# Patient Record
Sex: MALE | Race: WHITE | NOT HISPANIC OR LATINO | Employment: OTHER | URBAN - METROPOLITAN AREA
[De-identification: names, ages, dates, MRNs, and addresses within clinical notes are randomized per-mention and may not be internally consistent; named-entity substitution may affect disease eponyms.]

---

## 2019-07-23 ENCOUNTER — TRANSCRIBE ORDERS (OUTPATIENT)
Dept: NEUROSURGERY | Facility: CLINIC | Age: 49
End: 2019-07-23

## 2019-07-23 DIAGNOSIS — M54.50 LOWER BACK PAIN: Primary | ICD-10-CM

## 2019-08-20 ENCOUNTER — CONSULT (OUTPATIENT)
Dept: NEUROSURGERY | Facility: CLINIC | Age: 49
End: 2019-08-20
Payer: COMMERCIAL

## 2019-08-20 ENCOUNTER — DOCUMENTATION (OUTPATIENT)
Dept: NEUROSURGERY | Facility: CLINIC | Age: 49
End: 2019-08-20

## 2019-08-20 VITALS
BODY MASS INDEX: 29.93 KG/M2 | SYSTOLIC BLOOD PRESSURE: 121 MMHG | WEIGHT: 233.2 LBS | HEIGHT: 74 IN | DIASTOLIC BLOOD PRESSURE: 84 MMHG | RESPIRATION RATE: 16 BRPM | HEART RATE: 69 BPM

## 2019-08-20 DIAGNOSIS — M54.50 LOWER BACK PAIN: ICD-10-CM

## 2019-08-20 DIAGNOSIS — G89.4 CHRONIC PAIN SYNDROME: Primary | ICD-10-CM

## 2019-08-20 DIAGNOSIS — M96.1 FAILED BACK SURGICAL SYNDROME: ICD-10-CM

## 2019-08-20 DIAGNOSIS — G96.198 PSEUDOMENINGOCELE, ACQUIRED: ICD-10-CM

## 2019-08-20 PROCEDURE — 99204 OFFICE O/P NEW MOD 45 MIN: CPT | Performed by: NEUROLOGICAL SURGERY

## 2019-08-20 RX ORDER — SIMVASTATIN 10 MG
TABLET ORAL DAILY
COMMUNITY

## 2019-08-20 RX ORDER — BUTALBITAL/ACETAMINOPHEN 50MG-325MG
TABLET ORAL AS NEEDED
COMMUNITY

## 2019-08-20 RX ORDER — SERTRALINE HYDROCHLORIDE 100 MG/1
100 TABLET, FILM COATED ORAL DAILY
COMMUNITY
Start: 2019-05-06

## 2019-08-20 RX ORDER — HYDROXYZINE HYDROCHLORIDE 25 MG/1
25 TABLET, FILM COATED ORAL AS NEEDED
COMMUNITY
Start: 2018-08-02

## 2019-08-20 RX ORDER — OMEPRAZOLE 40 MG/1
40 CAPSULE, DELAYED RELEASE ORAL DAILY
Status: ON HOLD | COMMUNITY
Start: 2018-08-02 | End: 2020-07-02 | Stop reason: ALTCHOICE

## 2019-08-20 RX ORDER — OXYCODONE AND ACETAMINOPHEN 10; 325 MG/1; MG/1
TABLET ORAL 3 TIMES DAILY PRN
COMMUNITY
Start: 2018-12-19

## 2019-08-20 RX ORDER — TIZANIDINE 4 MG/1
4 TABLET ORAL DAILY
COMMUNITY
Start: 2018-12-19

## 2019-08-20 RX ORDER — GABAPENTIN 100 MG/1
200 CAPSULE ORAL 3 TIMES DAILY
COMMUNITY
Start: 2019-01-02

## 2019-08-20 RX ORDER — LISINOPRIL 5 MG/1
5 TABLET ORAL DAILY
COMMUNITY
Start: 2019-02-27

## 2019-08-20 NOTE — PROGRESS NOTES
Office Note - Neurosurgery   Rochelle Hodgkins 47 y o  male MRN: [id number]      Assessment:    Patient is stable  18-year-old gentleman with lower back and bilateral leg pain likely secondary to failed back syndrome and chronic pain syndrome  While he does have a pseudomeningocele he does not appear to be symptomatic from this  Would not recommend any surgical intervention to repair the pseudomeningocele, though a blood patch or glue could be considered through intervention Radiology  His presentation is more consistent with failed back syndrome or post laminectomy syndrome  There may be an element of arachnoiditis on his MRI  There may be an SI joint dysfunction component as well  He may wish to consider neuromodulation the form of spinal cord stimulator trial   If successful, he could return to his neurosurgeon to discuss implantation of a permanent system  Will follow up through this office on a p r n  Basis  History, physical examination and diagnostic tests were reviewed and questions answered  Diagnosis, care plan and treatment options were discussed  The patient understand instructions and will follow up as directed  Plan:    Follow-up: prn    Problem List Items Addressed This Visit        Nervous and Auditory    Pseudomeningocele, acquired       Other    Failed back surgical syndrome    Chronic pain syndrome - Primary      Other Visit Diagnoses     Lower back pain              Subjective/Objective     Chief Complaint    Low back and bilateral leg pain  HPI    18-year-old gentleman who underwent left L2-3 minimally invasive decompression in December of 2018 with another surgeon for lumbar radiculopathy  His radiculopathy resolved but he had significant positional headaches for months afterwards and developed lower back and bilateral leg pain which radiates mostly into the buttocks and hamstrings but can radiate down the legs  There is associated numbness and weakness    He denies any difficulties with bowel bladder function or changing perineal sensation  His current pain medications are listed below  He recently underwent facet injection which were somewhat helpful  He followed up with his neurosurgeon and was ultimately diagnosed with a pseudomeningocele  They discussed repair  He presents today for 2nd opinion  LETTY SAENZ personally reviewed and updated  Review of Systems   Constitutional: Positive for fatigue  HENT: Positive for hearing loss (right ear) and tinnitus  Negative for trouble swallowing  Eyes: Positive for visual disturbance  Respiratory: Negative  Cardiovascular: Negative  Gastrointestinal: Positive for constipation  Endocrine: Negative  Genitourinary: Negative  Musculoskeletal: Positive for back pain (centered of mid back radiates down to lower back, across lower back radiates into bilateral buttocks, bilateral hips, and down bilateral legs/foot )  Negative for gait problem  Skin: Negative  Allergic/Immunologic: Negative  Neurological: Positive for weakness (bilateral legs ) and headaches  Negative for dizziness, seizures, syncope and numbness (shocking/tingling down bilateral legs )  Hematological: Negative  Psychiatric/Behavioral: Positive for sleep disturbance (due to pain )  Negative for confusion         Family History    Family History   Problem Relation Age of Onset    Cancer Mother     Kidney failure Father     Cancer Father        Social History    Social History     Socioeconomic History    Marital status: /Civil Union     Spouse name: Not on file    Number of children: Not on file    Years of education: Not on file    Highest education level: Not on file   Occupational History    Not on file   Social Needs    Financial resource strain: Not on file    Food insecurity:     Worry: Not on file     Inability: Not on file    Transportation needs:     Medical: Not on file     Non-medical: Not on file   Tobacco Use    Smoking status: Never Smoker    Smokeless tobacco: Former User     Types: Chew   Substance and Sexual Activity    Alcohol use: Yes     Binge frequency: Monthly    Drug use: Never    Sexual activity: Not on file   Lifestyle    Physical activity:     Days per week: Not on file     Minutes per session: Not on file    Stress: Not on file   Relationships    Social connections:     Talks on phone: Not on file     Gets together: Not on file     Attends [employer] service: Not on file     Active member of club or organization: Not on file     Attends meetings of clubs or organizations: Not on file     Relationship status: Not on file    Intimate partner violence:     Fear of current or ex partner: Not on file     Emotionally abused: Not on file     Physically abused: Not on file     Forced sexual activity: Not on file   Other Topics Concern    Not on file   Social History Narrative    Not on file       Past Medical History    History reviewed  No pertinent past medical history      Surgical History    Past Surgical History:   Procedure Laterality Date    BACK SURGERY      KNEE SURGERY Left     LAMINECTOMY      LUMBAR DISCECTOMY      TUMOR REMOVAL      3 x from lumbar       Medications      Current Outpatient Medications:     ACETAMINOPHEN-BUTALBITAL  MG TABS, as needed, Disp: , Rfl:     diclofenac sodium (VOLTAREN) 1 %, as needed, Disp: , Rfl:     gabapentin (NEURONTIN) 100 mg capsule, Take 200 mg by mouth Three times a day, Disp: , Rfl:     hydrOXYzine HCL (ATARAX) 25 mg tablet, Take 25 mg by mouth as needed, Disp: , Rfl:     omeprazole (PriLOSEC) 40 MG capsule, Take 40 mg by mouth daily, Disp: , Rfl:     oxyCODONE-acetaminophen (PERCOCET)  mg per tablet, 3 (three) times a day as needed, Disp: , Rfl:     sertraline (ZOLOFT) 100 mg tablet, Take 100 mg by mouth daily, Disp: , Rfl:     tiZANidine (ZANAFLEX) 4 mg tablet, Take 4 mg by mouth daily, Disp: , Rfl:     lisinopril (ZESTRIL) 5 mg tablet, Take 5 mg by mouth daily, Disp: , Rfl:     simvastatin (ZOCOR) 10 mg tablet, daily, Disp: , Rfl:     Allergies    No Known Allergies    The following portions of the patient's history were reviewed and updated as appropriate: allergies, current medications, past family history, past medical history, past social history, past surgical history and problem list     Investigations    I personally reviewed the MRI results with the patient:    MRI of the lumbar spine without contrast dated April of 2019  Normal lumbar lordosis  At L2-3 there is been a left-sided medial facetectomy  There is a fluid collection consistent with meningocele at this level  Minimal stenosis at L4-5  No other areas of significant neural compression  There is some clumping of the nerve roots at the L2-3 level  No contrast had been administered  There is no central stenosis or clear mass effect on the nerve roots at the L2-3 level  Physical Exam    Vitals:  Blood pressure 121/84, pulse 69, resp  rate 16, height 6' 2" (1 88 m), weight 106 kg (233 lb 3 2 oz)  ,Body mass index is 29 94 kg/m²  Physical Exam   Constitutional: He is oriented to person, place, and time  He appears well-developed and well-nourished  No distress  HENT:   Head: Atraumatic  Eyes: EOM are normal    Pulmonary/Chest: Effort normal  No respiratory distress  Musculoskeletal: He exhibits no deformity  Some restriction range of motion on flexion and extension lumbar spine, though extension produces more pain than flexion  Tender palpation over left paraspinal lumbar incision  Tender to palpation over left SI joint  Gaenslen's test positive on the left  Neurological: He is alert and oriented to person, place, and time  5/5 power in lower extremities  Walks with a steady gait  Reports normal light touch sensation lower extremities  Straight leg raise negative bilaterally  Skin: Skin is warm and dry     Psychiatric: He has a normal mood and affect  His behavior is normal    Vitals reviewed  Neurologic Exam     Mental Status   Oriented to person, place, and time       Cranial Nerves     CN III, IV, VI   Extraocular motions are normal

## 2019-08-20 NOTE — PROGRESS NOTES
Received MRI L-Spine (dated 1/25/19), MRI L-Spine (dated 4/25/19), and MRI Hip/Pelvis (dated 5/9/19) disc at time of appointment on 8/20/19  Uploaded and returned to patient at time of appointment on 8/20/19

## 2019-09-05 ENCOUNTER — TELEPHONE (OUTPATIENT)
Dept: NEUROSURGERY | Facility: CLINIC | Age: 49
End: 2019-09-05

## 2020-06-26 DIAGNOSIS — Z11.59 SCREENING FOR VIRAL DISEASE: ICD-10-CM

## 2020-06-26 PROCEDURE — U0003 INFECTIOUS AGENT DETECTION BY NUCLEIC ACID (DNA OR RNA); SEVERE ACUTE RESPIRATORY SYNDROME CORONAVIRUS 2 (SARS-COV-2) (CORONAVIRUS DISEASE [COVID-19]), AMPLIFIED PROBE TECHNIQUE, MAKING USE OF HIGH THROUGHPUT TECHNOLOGIES AS DESCRIBED BY CMS-2020-01-R: HCPCS

## 2020-06-27 LAB — SARS-COV-2 RNA SPEC QL NAA+PROBE: NOT DETECTED

## 2020-06-29 ENCOUNTER — TRANSCRIBE ORDERS (OUTPATIENT)
Dept: ADMINISTRATIVE | Facility: HOSPITAL | Age: 50
End: 2020-06-29

## 2020-06-29 DIAGNOSIS — M54.50 LOW BACK PAIN, UNSPECIFIED BACK PAIN LATERALITY, UNSPECIFIED CHRONICITY, UNSPECIFIED WHETHER SCIATICA PRESENT: Primary | ICD-10-CM

## 2020-06-30 RX ORDER — COVID-19 ANTIGEN TEST
KIT MISCELLANEOUS
COMMUNITY

## 2020-06-30 NOTE — PRE-PROCEDURE INSTRUCTIONS
Pre-Surgery Instructions:   Medication Instructions    ACETAMINOPHEN-BUTALBITAL  MG TABS Instructed patient per Anesthesia Guidelines   diclofenac sodium (VOLTAREN) 1 % Instructed patient per Anesthesia Guidelines   gabapentin (NEURONTIN) 100 mg capsule Instructed patient per Anesthesia Guidelines   hydrOXYzine HCL (ATARAX) 25 mg tablet Instructed patient per Anesthesia Guidelines   lisinopril (ZESTRIL) 5 mg tablet Instructed patient per Anesthesia Guidelines   Naproxen Sodium (Aleve) 220 MG CAPS Instructed patient per Anesthesia Guidelines   oxyCODONE-acetaminophen (PERCOCET)  mg per tablet Instructed patient per Anesthesia Guidelines   sertraline (ZOLOFT) 100 mg tablet Instructed patient per Anesthesia Guidelines   simvastatin (ZOCOR) 10 mg tablet Instructed patient per Anesthesia Guidelines  Pre-op and bathing instructions given  Patient will use hibiclens if he buys some or if not, dial antibacterial soap for pre-op showers

## 2020-07-01 ENCOUNTER — HOSPITAL ENCOUNTER (OUTPATIENT)
Dept: MRI IMAGING | Facility: HOSPITAL | Age: 50
Discharge: HOME | End: 2020-07-01
Payer: COMMERCIAL

## 2020-07-01 ENCOUNTER — ANESTHESIA EVENT (OUTPATIENT)
Dept: PERIOP | Facility: HOSPITAL | Age: 50
End: 2020-07-01
Payer: COMMERCIAL

## 2020-07-01 DIAGNOSIS — M54.50 LOW BACK PAIN, UNSPECIFIED BACK PAIN LATERALITY, UNSPECIFIED CHRONICITY, UNSPECIFIED WHETHER SCIATICA PRESENT: ICD-10-CM

## 2020-07-01 PROCEDURE — 72148 MRI LUMBAR SPINE W/O DYE: CPT

## 2020-07-02 ENCOUNTER — ANESTHESIA (OUTPATIENT)
Dept: PERIOP | Facility: HOSPITAL | Age: 50
End: 2020-07-02
Payer: COMMERCIAL

## 2020-07-02 ENCOUNTER — HOSPITAL ENCOUNTER (OUTPATIENT)
Dept: RADIOLOGY | Facility: HOSPITAL | Age: 50
Setting detail: OUTPATIENT SURGERY
Discharge: HOME | End: 2020-07-02
Payer: COMMERCIAL

## 2020-07-02 ENCOUNTER — HOSPITAL ENCOUNTER (OUTPATIENT)
Facility: HOSPITAL | Age: 50
Setting detail: OUTPATIENT SURGERY
Discharge: HOME | End: 2020-07-02
Attending: ANESTHESIOLOGY | Admitting: ANESTHESIOLOGY
Payer: COMMERCIAL

## 2020-07-02 VITALS
OXYGEN SATURATION: 95 % | SYSTOLIC BLOOD PRESSURE: 124 MMHG | HEART RATE: 74 BPM | RESPIRATION RATE: 16 BRPM | HEIGHT: 74 IN | WEIGHT: 225 LBS | TEMPERATURE: 96.2 F | BODY MASS INDEX: 28.88 KG/M2 | DIASTOLIC BLOOD PRESSURE: 68 MMHG

## 2020-07-02 DIAGNOSIS — G89.4 CHRONIC PAIN SYNDROME: ICD-10-CM

## 2020-07-02 DIAGNOSIS — Z11.59 SCREENING FOR VIRAL DISEASE: Primary | ICD-10-CM

## 2020-07-02 PROCEDURE — C1778 LEAD, NEUROSTIMULATOR: HCPCS | Performed by: ANESTHESIOLOGY

## 2020-07-02 PROCEDURE — 72070 X-RAY EXAM THORAC SPINE 2VWS: CPT

## 2020-07-02 DEVICE — IMPLANTABLE DEVICE: Type: IMPLANTABLE DEVICE | Site: SPINE LUMBAR | Status: FUNCTIONAL

## 2020-07-02 RX ORDER — ONDANSETRON 2 MG/ML
INJECTION INTRAMUSCULAR; INTRAVENOUS AS NEEDED
Status: DISCONTINUED | OUTPATIENT
Start: 2020-07-02 | End: 2020-07-02 | Stop reason: SURG

## 2020-07-02 RX ORDER — LIDOCAINE HYDROCHLORIDE 20 MG/ML
INJECTION, SOLUTION INFILTRATION; PERINEURAL AS NEEDED
Status: DISCONTINUED | OUTPATIENT
Start: 2020-07-02 | End: 2020-07-02 | Stop reason: SURG

## 2020-07-02 RX ORDER — FENTANYL CITRATE/PF 50 MCG/ML
25 SYRINGE (ML) INJECTION
Status: DISCONTINUED | OUTPATIENT
Start: 2020-07-02 | End: 2020-07-02 | Stop reason: HOSPADM

## 2020-07-02 RX ORDER — PROPOFOL 10 MG/ML
INJECTION, EMULSION INTRAVENOUS CONTINUOUS PRN
Status: DISCONTINUED | OUTPATIENT
Start: 2020-07-02 | End: 2020-07-02 | Stop reason: SURG

## 2020-07-02 RX ORDER — CEFAZOLIN SODIUM 2 G/50ML
SOLUTION INTRAVENOUS AS NEEDED
Status: DISCONTINUED | OUTPATIENT
Start: 2020-07-02 | End: 2020-07-02 | Stop reason: SURG

## 2020-07-02 RX ORDER — SODIUM CHLORIDE 9 MG/ML
125 INJECTION, SOLUTION INTRAVENOUS CONTINUOUS
Status: DISCONTINUED | OUTPATIENT
Start: 2020-07-02 | End: 2020-07-02 | Stop reason: HOSPADM

## 2020-07-02 RX ORDER — FENTANYL CITRATE 50 UG/ML
INJECTION, SOLUTION INTRAMUSCULAR; INTRAVENOUS AS NEEDED
Status: DISCONTINUED | OUTPATIENT
Start: 2020-07-02 | End: 2020-07-02 | Stop reason: SURG

## 2020-07-02 RX ORDER — LIDOCAINE HYDROCHLORIDE AND EPINEPHRINE 10; 10 MG/ML; UG/ML
INJECTION, SOLUTION INFILTRATION; PERINEURAL AS NEEDED
Status: DISCONTINUED | OUTPATIENT
Start: 2020-07-02 | End: 2020-07-02 | Stop reason: HOSPADM

## 2020-07-02 RX ORDER — ALBUTEROL SULFATE 2.5 MG/3ML
2.5 SOLUTION RESPIRATORY (INHALATION) ONCE AS NEEDED
Status: DISCONTINUED | OUTPATIENT
Start: 2020-07-02 | End: 2020-07-02 | Stop reason: HOSPADM

## 2020-07-02 RX ORDER — HYDROMORPHONE HCL/PF 1 MG/ML
0.5 SYRINGE (ML) INJECTION
Status: DISCONTINUED | OUTPATIENT
Start: 2020-07-02 | End: 2020-07-02 | Stop reason: HOSPADM

## 2020-07-02 RX ORDER — CEFAZOLIN SODIUM 2 G/50ML
2000 SOLUTION INTRAVENOUS ONCE
Status: COMPLETED | OUTPATIENT
Start: 2020-07-02 | End: 2020-07-02

## 2020-07-02 RX ADMIN — FENTANYL CITRATE 100 MCG: 50 INJECTION, SOLUTION INTRAMUSCULAR; INTRAVENOUS at 14:20

## 2020-07-02 RX ADMIN — CEFAZOLIN SODIUM 2000 MG: 2 SOLUTION INTRAVENOUS at 13:45

## 2020-07-02 RX ADMIN — ONDANSETRON 4 MG: 2 INJECTION INTRAMUSCULAR; INTRAVENOUS at 14:28

## 2020-07-02 RX ADMIN — PROPOFOL 150 MCG/KG/MIN: 10 INJECTION, EMULSION INTRAVENOUS at 14:15

## 2020-07-02 RX ADMIN — LIDOCAINE HYDROCHLORIDE 3 ML: 20 INJECTION, SOLUTION INFILTRATION; PERINEURAL at 14:15

## 2020-07-02 RX ADMIN — SODIUM CHLORIDE 125 ML/HR: 0.9 INJECTION, SOLUTION INTRAVENOUS at 13:16

## 2020-07-02 RX ADMIN — FENTANYL CITRATE 100 MCG: 50 INJECTION, SOLUTION INTRAMUSCULAR; INTRAVENOUS at 14:13

## 2020-07-02 RX ADMIN — HYDROMORPHONE HYDROCHLORIDE 0.5 MG: 1 INJECTION, SOLUTION INTRAMUSCULAR; INTRAVENOUS; SUBCUTANEOUS at 15:33

## 2020-07-02 RX ADMIN — HYDROMORPHONE HYDROCHLORIDE 0.5 MG: 1 INJECTION, SOLUTION INTRAMUSCULAR; INTRAVENOUS; SUBCUTANEOUS at 15:44

## 2020-07-02 NOTE — INTERVAL H&P NOTE
H&P reviewed  After examining the patient I find no changes in the patients condition since the H&P had been written      Vitals:    07/02/20 1254   BP: 142/79   Pulse: 80   Resp: 16   Temp: 97 6 °F (36 4 °C)   SpO2: 98%

## 2020-07-02 NOTE — OP NOTE
OPERATIVE REPORT  PATIENT NAME: Jil Dao    :  1970  MRN: [id number]  Pt Location: [facility] OR ROOM 02    SURGERY DATE: 2020    Surgeon(s) and Role:     * Elizabeth García MD - Primary    Preop Diagnosis:  Chronic pain syndrome [G89 4]  Radiculopathy, lumbar region [M54 16]    Post-Op Diagnosis Codes:     * Chronic pain syndrome [G89 4]     * Radiculopathy, lumbar region [M54 16]    Procedure(s) (LRB):  SPINAL CORD STIMULATOR TRIAL (N/A)    Specimen(s):  * No specimens in log *    Estimated Blood Loss:   Minimal    Drains:  * No LDAs found *    Anesthesia Type:   Choice    Operative Indications:  Chronic pain syndrome [G89 4]  Radiculopathy, lumbar region [M54 16]      Operative Findings:  n/a    Complications:   None    Procedure and Technique:  Informed written consent was obtained  The patient was taken to the operating room and placed prone on the fluoroscopy table  An updated MRI of his lumbar spine was performed yesterday to make sure that the pseudomeningocele did not extend to the L1-2 level form the L2-3 level where his surgery was performed  His thoracolumbar region was prepped and draped in the usual sterile fashion using DuraPrep  Under fluoroscopic guidance, the L1-2 interspace was identified  A skin wheal was raised to the right side of the L3 spinous process  A modified 14 gauge epidural needle provided by [employer] was inserted through the skin wheal and advanced under fluoroscopic guidance to the L1-2 epidural space  The position of the needle into the epidural space was confirmed under lateral view  The [employer] trial spinal cord stimulator lead was inserted through the needle and advanced under AP view and lateral view to the thoracic epidural space midline to the level of upper part of T9 vertebra  The impedance for each contact was within normal limit  Different electrode combinations were tested    The patient reported excellent paresthesia across his lower back, down to his coccyx, and down both legs covering 100% of his usual painful area  The needle was then removed uneventfully and the lead anchored to the lumbar region at the needle insertion site using 3-0 Vicryl  Sterile dressings were applied  The patient was taken to the recovery room for programming of the spinal cord stimulator  The patient will return to the office in 5 days for removal of the trial lead  If he obtains more than 60% pain relief, surgical implantation of the unit will be discussed with the patient     I was present for the entire procedure    Patient Disposition:  PACU     SIGNATURE: Heidy Hinojosa MD  DATE: July 2, 2020  TIME: 3:12 PM

## 2020-07-02 NOTE — ANESTHESIA PREPROCEDURE EVALUATION
Review of Systems/Medical History  Patient summary reviewed  Chart reviewed  No history of anesthetic complications     Cardiovascular  Hyperlipidemia, Hypertension controlled,    Pulmonary  Smoker ex-smoker  ,        GI/Hepatic  Negative GI/hepatic ROS          Negative  ROS        Endo/Other  Negative endo/other ROS      GYN       Hematology   Musculoskeletal  Back pain , lumbar pain, Sciatica,        Neurology    Headaches,    Psychology     Chronic pain (Failed back syndrome),            Physical Exam    Airway    Mallampati score: II  TM Distance: >3 FB  Neck ROM: full     Dental   No notable dental hx     Cardiovascular  Rhythm: regular, Rate: normal, Cardiovascular exam normal    Pulmonary  Pulmonary exam normal Breath sounds clear to auscultation,     Other Findings        Anesthesia Plan  ASA Score- 2     Anesthesia Type- IV sedation with anesthesia with ASA Monitors  Additional Monitors:   Airway Plan:         Plan Factors-Patient not instructed to abstain from smoking on day of procedure  Patient did not smoke on day of surgery  Induction- intravenous  Postoperative Plan-     Informed Consent- Anesthetic plan and risks discussed with patient and spouse

## 2020-07-02 NOTE — ANESTHESIA POSTPROCEDURE EVALUATION
Post-Op Assessment Note    CV Status:  Stable  Pain Score: 6       Mental Status:  Alert and awake   Hydration Status:  Euvolemic   PONV Controlled:  Controlled   Airway Patency:  Patent   Post Op Vitals Reviewed: Yes      Staff: Anesthesiologist, CRNA           BP      Temp     Pulse     Resp      SpO2

## 2020-08-03 ENCOUNTER — TRANSCRIBE ORDERS (OUTPATIENT)
Dept: NEUROSURGERY | Facility: CLINIC | Age: 50
End: 2020-08-03

## 2020-08-03 DIAGNOSIS — M54.50 LOWER BACK PAIN: Primary | ICD-10-CM

## 2020-08-17 ENCOUNTER — CONSULT (OUTPATIENT)
Dept: NEUROSURGERY | Facility: CLINIC | Age: 50
End: 2020-08-17
Payer: COMMERCIAL

## 2020-08-17 VITALS
HEIGHT: 74 IN | TEMPERATURE: 97 F | SYSTOLIC BLOOD PRESSURE: 140 MMHG | HEART RATE: 90 BPM | DIASTOLIC BLOOD PRESSURE: 90 MMHG | RESPIRATION RATE: 16 BRPM | BODY MASS INDEX: 28.88 KG/M2 | WEIGHT: 225 LBS

## 2020-08-17 DIAGNOSIS — M54.16 LUMBAR RADICULOPATHY: ICD-10-CM

## 2020-08-17 DIAGNOSIS — M96.1 POSTLAMINECTOMY SYNDROME: ICD-10-CM

## 2020-08-17 DIAGNOSIS — G89.4 CHRONIC PAIN SYNDROME: Primary | ICD-10-CM

## 2020-08-17 DIAGNOSIS — M54.50 LOWER BACK PAIN: ICD-10-CM

## 2020-08-17 PROCEDURE — 99204 OFFICE O/P NEW MOD 45 MIN: CPT | Performed by: NEUROLOGICAL SURGERY

## 2020-08-17 RX ORDER — BUPIVACAINE HYDROCHLORIDE 2.5 MG/ML
1 INJECTION, SOLUTION EPIDURAL; INFILTRATION; INTRACAUDAL
COMMUNITY

## 2020-08-17 RX ORDER — CEFAZOLIN SODIUM 2 G/50ML
2000 SOLUTION INTRAVENOUS ONCE
Status: CANCELLED | OUTPATIENT
Start: 2020-08-17 | End: 2020-08-17

## 2020-08-17 RX ORDER — CHLORHEXIDINE GLUCONATE 0.12 MG/ML
15 RINSE ORAL ONCE
Status: CANCELLED | OUTPATIENT
Start: 2020-08-17 | End: 2020-08-17

## 2020-08-17 RX ORDER — TADALAFIL 5 MG/1
TABLET ORAL
COMMUNITY

## 2020-08-17 NOTE — PROGRESS NOTES
Assessment/Plan:    No problem-specific Assessment & Plan notes found for this encounter  Patient is stable  Symptoms, as detailed in HPI, continue to significantly impact of patient's quality of life in daily activities  After carefully considering presentation, investigations, functional status and co-morbidities, the risk/benefit profile of surgical intervention is favorable  History, physical examination and diagnostic tests were reviewed and questions answered  Diagnosis, care plan and treatment options were discussed  The patient understand instructions and will follow up as directed  Patient with successful stimulator trial with [facility]  Patient reports greater than 50% relief of back and leg pain  He does want MRI compatibility  Recommend percutaneous SCS implantation  Expected postoperative course, including activity restrictions, expected pain and postoperative medication were reviewed  Patient provided verbal consent to surgical procedure and signed consent form: Yes    We also discussed the risks and benefits of the procedure the risks being including: infection (~2%), neurologic injury (<1%), new pain, revisions surgery, failure to relieve pain, hardware issues  The benefits including relief of pain as in trial  The patient stated understanding of the risks and benefits and agreed to proceed  IMAGING REVIEWED: MRI lumbar shows post operative changes, pseudomeningocele noted         Diagnoses and all orders for this visit:    Chronic pain syndrome  -     Case request operating room: INSERTION THORACIC DORSAL COLUMN SPINAL CORD STIMULATOR PERCUTANEOUS W IMPLANTABLE PULSE GENERATOR, LEFT; Standing  -     Case request operating room: INSERTION THORACIC DORSAL COLUMN SPINAL CORD STIMULATOR PERCUTANEOUS W IMPLANTABLE PULSE GENERATOR, LEFT    Postlaminectomy syndrome  -     Case request operating room: [facility] [address] PULSE GENERATOR, LEFT; Standing  -     Case request operating room: INSERTION THORACIC DORSAL COLUMN SPINAL CORD STIMULATOR PERCUTANEOUS W IMPLANTABLE PULSE GENERATOR, LEFT    Lower back pain  -     Ambulatory referral to Neurosurgery    Lumbar radiculopathy    Other orders  -     tadalafil (CIALIS) 5 MG tablet; tadalafil 5 mg tablet  -     bupivacaine, PF, (MARCAINE) 0 25 %; 1 mL  -     Diet NPO; Sips with meds; Standing  -     Nursing Communication Warmimg Interventions Implemented; Standing  -     Nursing Communication CHG bath, have staff wash entire body (neck down) per pre-op bathing protocol  Routine, evening prior to, and day of surgery ; Standing  -     Nursing Communication Swab both nares with Povidone-Iodine solution, EXCLUDE if patient has shellfish/Iodine allergy  Routine, day of surgery, on call to OR; Standing  -     chlorhexidine (PERIDEX) 0 12 % oral rinse 15 mL  -     Void on call to OR; Standing  -     Insert peripheral IV; Standing  -     ceFAZolin (ANCEF) IVPB (premix) 2,000 mg 50 mL          I have spent 60 minutes with Patient  today in which greater than 50% of this time was spent in counseling/coordination of care regarding Diagnostic results, Prognosis, Risks and benefits of tx options, Intructions for management, Patient and family education, Importance of tx compliance, Risk factor reductions and Impressions  Subjective:      Patient ID: Lu Urrutia is a 45 y o  male  Patient is a 45year old male with symptoms of low back and bilateral leg pain  Pain is severe and thorbbing in quality  Pain distribution is low back and legs  Pain is worse with standing  Pain is improved by rest  The pain has been present for several months  Pain has associated reduced activity  The patient underwent the following conservative treatments including PT and prior surgery  The patient reported symptoms started when she had surgery last year   He did well but developed postoperative low back and intermittent leg pain  The patient underwent a successful [employer] scientific trial with greater than 50 % relief of pain  They reported improvement in activity level  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  The following portions of the patient's history were reviewed and updated as appropriate:   He  has a past medical history of Chronic headaches, Chronic pain, Elevated cholesterol, and Hypertension  He   Patient Active Problem List    Diagnosis Date Noted    Failed back surgical syndrome 08/20/2019    Chronic pain syndrome 08/20/2019    Pseudomeningocele, acquired 08/20/2019     He  has a past surgical history that includes Back surgery; Tumor removal; Laminectomy; Lumbar discectomy; Knee surgery (Left); and pr percut implnt neuroelect,epidural (N/A, 7/2/2020)  His family history includes Cancer in his father and mother; Kidney failure in his father  He  reports that he has never smoked  He has quit using smokeless tobacco   His smokeless tobacco use included chew  He reports current alcohol use  He reports that he does not use drugs    Current Outpatient Medications   Medication Sig Dispense Refill    ACETAMINOPHEN-BUTALBITAL  MG TABS as needed      diclofenac sodium (VOLTAREN) 1 % as needed      gabapentin (NEURONTIN) 100 mg capsule Take 200 mg by mouth Three times a day      hydrOXYzine HCL (ATARAX) 25 mg tablet Take 25 mg by mouth as needed      lisinopril (ZESTRIL) 5 mg tablet Take 5 mg by mouth daily      Naproxen Sodium (Aleve) 220 MG CAPS Take by mouth      oxyCODONE-acetaminophen (PERCOCET)  mg per tablet 3 (three) times a day as needed      sertraline (ZOLOFT) 100 mg tablet Take 100 mg by mouth daily      simvastatin (ZOCOR) 10 mg tablet daily      bupivacaine, PF, (MARCAINE) 0 25 % 1 mL      tadalafil (CIALIS) 5 MG tablet tadalafil 5 mg tablet      tiZANidine (ZANAFLEX) 4 mg tablet Take 4 mg by mouth daily       No current facility-administered medications for this visit  Current Outpatient Medications on File Prior to Visit   Medication Sig    ACETAMINOPHEN-BUTALBITAL  MG TABS as needed    diclofenac sodium (VOLTAREN) 1 % as needed    gabapentin (NEURONTIN) 100 mg capsule Take 200 mg by mouth Three times a day    hydrOXYzine HCL (ATARAX) 25 mg tablet Take 25 mg by mouth as needed    lisinopril (ZESTRIL) 5 mg tablet Take 5 mg by mouth daily    Naproxen Sodium (Aleve) 220 MG CAPS Take by mouth    oxyCODONE-acetaminophen (PERCOCET)  mg per tablet 3 (three) times a day as needed    sertraline (ZOLOFT) 100 mg tablet Take 100 mg by mouth daily    simvastatin (ZOCOR) 10 mg tablet daily    bupivacaine, PF, (MARCAINE) 0 25 % 1 mL    tadalafil (CIALIS) 5 MG tablet tadalafil 5 mg tablet    tiZANidine (ZANAFLEX) 4 mg tablet Take 4 mg by mouth daily     No current facility-administered medications on file prior to visit  He is allergic to pollen extract       Review of Systems   Constitutional: Negative  HENT: Negative  Eyes: Positive for pain (with HA, alos pressure)  Respiratory: Negative  Cardiovascular: Negative  Gastrointestinal: Negative  Endocrine: Negative  Musculoskeletal: Positive for back pain (LBP radiating to bilateral leg down sides)  Hx of lumbar sx  CSF fluid build up  Hx of of tumor removal    Skin: Negative  Allergic/Immunologic: Positive for environmental allergies  Neurological: Positive for numbness (hands) and headaches (daily)  Hematological: Negative  Psychiatric/Behavioral: Negative  I have personally reviewed all aspects of the review of systems as documented    Objective:      /90 (BP Location: Left arm)   Pulse 90   Temp (!) 97 °F (36 1 °C) (Tympanic)   Resp 16   Ht 6' 2" (1 88 m)   Wt 102 kg (225 lb)   BMI 28 89 kg/m²          Physical Exam  Constitutional:       General: He is not in acute distress  Appearance: Normal appearance  He is normal weight   He is not ill-appearing  HENT:      Head: Normocephalic and atraumatic  Eyes:      General:         Right eye: No discharge  Left eye: No discharge  Extraocular Movements: Extraocular movements intact  Conjunctiva/sclera: Conjunctivae normal       Pupils: Pupils are equal, round, and reactive to light  Neck:      Musculoskeletal: Normal range of motion  Cardiovascular:      Rate and Rhythm: Normal rate  Pulmonary:      Effort: Pulmonary effort is normal  No respiratory distress  Breath sounds: Normal breath sounds  No wheezing  Musculoskeletal: Normal range of motion  General: No swelling  Skin:     General: Skin is warm and dry  Neurological:      General: No focal deficit present  Mental Status: He is alert and oriented to person, place, and time  Mental status is at baseline  Cranial Nerves: No cranial nerve deficit  Sensory: No sensory deficit  Psychiatric:         Mood and Affect: Mood normal          Thought Content:  Thought content normal

## 2020-08-18 ENCOUNTER — TELEPHONE (OUTPATIENT)
Dept: NEUROSURGERY | Facility: CLINIC | Age: 50
End: 2020-08-18

## 2020-08-18 NOTE — TELEPHONE ENCOUNTER
Pt reports an MRI thoracic spine was ordered yesterday during his consult  He reports he has a disc of an MRI thoracic completed at [facility] in June prior to his trial  Thony Kuo has retrieved the report, and pt was instructed to drop the disc off asap  He is agreeable

## (undated) DEVICE — PREP SURGICAL PURPREP 26ML

## (undated) DEVICE — GAUZE SPONGES,16 PLY: Brand: CURITY

## (undated) DEVICE — DRAPE C-ARM X-RAY

## (undated) DEVICE — DRAPE SHEET THREE QUARTER

## (undated) DEVICE — SPECIMEN CONTAINER STERILE PEEL PACK

## (undated) DEVICE — SCD SEQUENTIAL COMPRESSION COMFORT SLEEVE MEDIUM KNEE LENGTH: Brand: KENDALL SCD

## (undated) DEVICE — DRAPE LAPAROTOMY W/POUCHES

## (undated) DEVICE — 3M™ STERI-STRIP™ COMPOUND BENZOIN TINCTURE 40 BAGS/CARTON 4 CARTONS/CASE C1544: Brand: 3M™ STERI-STRIP™

## (undated) DEVICE — DRAPE EQUIPMENT RF WAND

## (undated) DEVICE — RX COUDÉ® EPIDURAL NEEDLE 14G TW X 4.0": Brand: EPIMED

## (undated) DEVICE — GLOVE RADIATION 7 1/2 PF

## (undated) DEVICE — TRAY EPIDURAL SINGLE SHOT

## (undated) DEVICE — SKIN MARKER DUAL TIP WITH RULER CAP, FLEXIBLE RULER AND LABELS: Brand: DEVON

## (undated) DEVICE — 2963 MEDIPORE SOFT CLOTH TAPE 3 IN X 10 YD 12 RLS/CS: Brand: 3M™ MEDIPORE™

## (undated) DEVICE — O.R. CABLE 1X16, 61CM AND EXTENSION

## (undated) DEVICE — PATIENT TRIAL KIT: Brand: PATIENT TRIAL KIT

## (undated) DEVICE — SUT SILK 2-0 SH 30 IN K833H

## (undated) DEVICE — 3M™ STERI-STRIP™ REINFORCED ADHESIVE SKIN CLOSURES, R1547, 1/2 IN X 4 IN (12 MM X 100 MM), 6 STRIPS/ENVELOPE: Brand: 3M™ STERI-STRIP™

## (undated) DEVICE — ASTOUND STANDARD SURGICAL GOWN, XL: Brand: CONVERTORS

## (undated) DEVICE — GLOVE SRG BIOGEL 7.5

## (undated) DEVICE — 3M™ TEGADERM™ TRANSPARENT FILM DRESSING FRAME STYLE, 1626W, 4 IN X 4-3/4 IN (10 CM X 12 CM), 50/CT 4CT/CASE: Brand: 3M™ TEGADERM™